# Patient Record
Sex: FEMALE | Race: ASIAN | NOT HISPANIC OR LATINO
[De-identification: names, ages, dates, MRNs, and addresses within clinical notes are randomized per-mention and may not be internally consistent; named-entity substitution may affect disease eponyms.]

---

## 2018-02-05 ENCOUNTER — APPOINTMENT (OUTPATIENT)
Dept: UROLOGY | Facility: CLINIC | Age: 48
End: 2018-02-05
Payer: COMMERCIAL

## 2018-02-05 VITALS
HEIGHT: 62 IN | SYSTOLIC BLOOD PRESSURE: 113 MMHG | DIASTOLIC BLOOD PRESSURE: 71 MMHG | BODY MASS INDEX: 22.45 KG/M2 | TEMPERATURE: 97.7 F | WEIGHT: 122 LBS | HEART RATE: 71 BPM

## 2018-02-05 DIAGNOSIS — Z86.018 PERSONAL HISTORY OF OTHER BENIGN NEOPLASM: ICD-10-CM

## 2018-02-05 PROCEDURE — 99204 OFFICE O/P NEW MOD 45 MIN: CPT

## 2018-03-04 ENCOUNTER — FORM ENCOUNTER (OUTPATIENT)
Age: 48
End: 2018-03-04

## 2018-03-05 ENCOUNTER — APPOINTMENT (OUTPATIENT)
Dept: UROLOGY | Facility: CLINIC | Age: 48
End: 2018-03-05
Payer: COMMERCIAL

## 2018-03-05 ENCOUNTER — OUTPATIENT (OUTPATIENT)
Dept: OUTPATIENT SERVICES | Facility: HOSPITAL | Age: 48
LOS: 1 days | End: 2018-03-05
Payer: COMMERCIAL

## 2018-03-05 ENCOUNTER — APPOINTMENT (OUTPATIENT)
Dept: ULTRASOUND IMAGING | Facility: IMAGING CENTER | Age: 48
End: 2018-03-05
Payer: COMMERCIAL

## 2018-03-05 DIAGNOSIS — Z00.00 ENCOUNTER FOR GENERAL ADULT MEDICAL EXAMINATION WITHOUT ABNORMAL FINDINGS: ICD-10-CM

## 2018-03-05 DIAGNOSIS — N20.1 CALCULUS OF URETER: ICD-10-CM

## 2018-03-05 PROCEDURE — 99213 OFFICE O/P EST LOW 20 MIN: CPT

## 2018-03-05 PROCEDURE — 76770 US EXAM ABDO BACK WALL COMP: CPT | Mod: 26

## 2018-03-05 PROCEDURE — 76770 US EXAM ABDO BACK WALL COMP: CPT

## 2018-09-13 ENCOUNTER — APPOINTMENT (OUTPATIENT)
Dept: UROLOGY | Facility: CLINIC | Age: 48
End: 2018-09-13
Payer: COMMERCIAL

## 2018-09-13 ENCOUNTER — OUTPATIENT (OUTPATIENT)
Dept: OUTPATIENT SERVICES | Facility: HOSPITAL | Age: 48
LOS: 1 days | End: 2018-09-13
Payer: COMMERCIAL

## 2018-09-13 DIAGNOSIS — N20.1 CALCULUS OF URETER: ICD-10-CM

## 2018-09-13 DIAGNOSIS — Z00.00 ENCOUNTER FOR GENERAL ADULT MEDICAL EXAMINATION W/OUT ABNORMAL FINDINGS: ICD-10-CM

## 2018-09-13 DIAGNOSIS — R35.0 FREQUENCY OF MICTURITION: ICD-10-CM

## 2018-09-13 PROCEDURE — 99213 OFFICE O/P EST LOW 20 MIN: CPT | Mod: 25

## 2018-09-13 PROCEDURE — 76775 US EXAM ABDO BACK WALL LIM: CPT | Mod: 26

## 2018-09-13 PROCEDURE — 76775 US EXAM ABDO BACK WALL LIM: CPT

## 2018-09-14 DIAGNOSIS — N20.1 CALCULUS OF URETER: ICD-10-CM

## 2019-09-12 ENCOUNTER — APPOINTMENT (OUTPATIENT)
Dept: UROLOGY | Facility: CLINIC | Age: 49
End: 2019-09-12

## 2024-01-15 ENCOUNTER — LABORATORY RESULT (OUTPATIENT)
Age: 54
End: 2024-01-15

## 2024-01-15 ENCOUNTER — APPOINTMENT (OUTPATIENT)
Dept: PULMONOLOGY | Facility: CLINIC | Age: 54
End: 2024-01-15
Payer: COMMERCIAL

## 2024-01-15 VITALS
BODY MASS INDEX: 25.03 KG/M2 | DIASTOLIC BLOOD PRESSURE: 89 MMHG | OXYGEN SATURATION: 97 % | WEIGHT: 136 LBS | HEART RATE: 80 BPM | HEIGHT: 62 IN | SYSTOLIC BLOOD PRESSURE: 131 MMHG

## 2024-01-15 DIAGNOSIS — I10 ESSENTIAL (PRIMARY) HYPERTENSION: ICD-10-CM

## 2024-01-15 DIAGNOSIS — K76.0 FATTY (CHANGE OF) LIVER, NOT ELSEWHERE CLASSIFIED: ICD-10-CM

## 2024-01-15 PROCEDURE — 94727 GAS DIL/WSHOT DETER LNG VOL: CPT

## 2024-01-15 PROCEDURE — 94729 DIFFUSING CAPACITY: CPT

## 2024-01-15 PROCEDURE — ZZZZZ: CPT

## 2024-01-15 PROCEDURE — 95012 NITRIC OXIDE EXP GAS DETER: CPT

## 2024-01-15 PROCEDURE — 36415 COLL VENOUS BLD VENIPUNCTURE: CPT

## 2024-01-15 PROCEDURE — 99204 OFFICE O/P NEW MOD 45 MIN: CPT | Mod: 25

## 2024-01-15 PROCEDURE — 88738 HGB QUANT TRANSCUTANEOUS: CPT

## 2024-01-15 PROCEDURE — 94060 EVALUATION OF WHEEZING: CPT

## 2024-01-15 RX ORDER — FLUTICASONE PROPIONATE AND SALMETEROL XINAFOATE 115; 21 UG/1; UG/1
115-21 AEROSOL, METERED RESPIRATORY (INHALATION)
Qty: 1 | Refills: 3 | Status: ACTIVE | COMMUNITY
Start: 2024-01-15 | End: 1900-01-01

## 2024-01-15 RX ORDER — LOSARTAN POTASSIUM 100 MG/1
TABLET, FILM COATED ORAL
Refills: 0 | Status: ACTIVE | COMMUNITY

## 2024-01-15 NOTE — ASSESSMENT
[FreeTextEntry1] : Venipuncture with labs drawn in office Get PET scan for further evaluation. Start Advair 115/21 mcg HFA, 2 puffs twice daily for asthma. Start Flonase nasal spray for postnasal drip. Return for pulmonary follow-up after PET scan. Also advised her to closely follow with you clinically. at assisted living facility

## 2024-01-15 NOTE — HISTORY OF PRESENT ILLNESS
[TextBox_4] : Sherry is a pleasant 53-year-old female without significant past medical history, she recently choked on a fishbone, and went to [facility], she had s/p chest CT scan while there, which revealed an 8mm RUL lung nodule. She complains of mild exertional dyspnea and nasal congestion for years, but no chest pain or cough.   She is a non-smoker.

## 2024-01-15 NOTE — DISCUSSION/SUMMARY
[FreeTextEntry1] : Sherry is a patient with cough and nasal congestion likely secondary to a combination of active asthma and postnasal drip.  Secondly, she is a patient with 8 mm right upper lobe spiculated lung nodule, possibly secondary to postinflammatory changes/scarring, need to definitively rule out Malignancy

## 2024-01-15 NOTE — CONSULT LETTER
[Dear  ___] : Dear  [unfilled], [Courtesy Letter:] : I had the pleasure of seeing your patient, [unfilled], in my office today. [Please see my note below.] : Please see my note below. [Consult Closing:] : Thank you very much for allowing me to participate in the care of this patient.  If you have any questions, please do not hesitate to contact me. [Sincerely,] : Sincerely, [FreeTextEntry3] : Dr. Jana Raines

## 2024-01-15 NOTE — REASON FOR VISIT
[Consultation] : a consultation [Abnormal CXR/ Chest CT] : an abnormal CXR/ chest CT [Shortness of Breath] : shortness of breath [TextBox_13] : Dr. Marilia Faustin

## 2024-01-15 NOTE — PROCEDURE
[FreeTextEntry1] : Pulmonary function test performed in my office today: Spirometry shows mild obstructive airway disease with significant improvement postbronchodilator, consistent with asthma; lung volumes within normal limits with air trapping; diffusion is within normal limits. _________  Exhaled Nitric Oxide             Final  No Documents Attached    	Test  	Result  	Flag	Reference	Goal	Last Verified  	Exhaled Nitric Oxide	16	 	 		REQUIRED  Ordered by: ABDI THORNE       Collected/Examined: 15Jan2024 10:24AM       Verification Required       Stage: Final       Performed at: In Office       Performed by: ABDI THORNE       Resulted: 15Jan2024 10:24AM       Last Updated: 15Jan2024 10:25AM  __________ Chest CT scan performed on December 4, 2023 a questionable right lung apex 0.8 cm irregular lung nodule.

## 2024-01-16 LAB
ACE BLD-CCNC: 34 U/L
BASOPHILS # BLD AUTO: 0.04 K/UL
BASOPHILS NFR BLD AUTO: 0.9 %
CK SERPL-CCNC: 50 U/L
CRP SERPL-MCNC: <3 MG/L
ENA JO1 AB SER IA-ACNC: <0.2 AL
ENA SCL70 IGG SER IA-ACNC: <0.2 AL
EOSINOPHIL # BLD AUTO: 0.11 K/UL
EOSINOPHIL NFR BLD AUTO: 2.5 %
ERYTHROCYTE [SEDIMENTATION RATE] IN BLOOD BY WESTERGREN METHOD: 30 MM/HR
HCT VFR BLD CALC: 42 %
HGB BLD-MCNC: 13.6 G/DL
HIV1+2 AB SPEC QL IA.RAPID: NONREACTIVE
IMM GRANULOCYTES NFR BLD AUTO: 0.2 %
LYMPHOCYTES # BLD AUTO: 1.14 K/UL
LYMPHOCYTES NFR BLD AUTO: 26 %
MAN DIFF?: NORMAL
MCHC RBC-ENTMCNC: 30.2 PG
MCHC RBC-ENTMCNC: 32.4 GM/DL
MCV RBC AUTO: 93.3 FL
MONOCYTES # BLD AUTO: 0.32 K/UL
MONOCYTES NFR BLD AUTO: 7.3 %
NEUTROPHILS # BLD AUTO: 2.77 K/UL
NEUTROPHILS NFR BLD AUTO: 63.1 %
PLATELET # BLD AUTO: 275 K/UL
POCT - HEMOGLOBIN (HGB), QUANTITATIVE, TRANSCUTANEOUS: 12.3
RBC # BLD: 4.5 M/UL
RBC # FLD: 13.2 %
RHEUMATOID FACT SER QL: <10 IU/ML
WBC # FLD AUTO: 4.39 K/UL

## 2024-01-21 LAB
ALTERN TENCAPG(M6): 2.5 MCG/ML
ANA SER IF-ACNC: NEGATIVE
ASPER FUMCAPG(M3): 36.1 MCG/ML
AUREOBASCAPG(M12): 3.9 MCG/ML
LACEYELLA SACCHARI IGG: 10 MCG/ML
M TB IFN-G BLD-IMP: NEGATIVE
MICROPOLYCAPG(M22): <2 MCG/ML
PENIC CHRYCAPG(M1): 33.8 MCG/ML
PHOMA BETAE IGG: <2 MCG/ML
QUANTIFERON TB PLUS MITOGEN MINUS NIL: 4.11 IU/ML
QUANTIFERON TB PLUS NIL: 0.02 IU/ML
QUANTIFERON TB PLUS TB1 MINUS NIL: -0.01 IU/ML
QUANTIFERON TB PLUS TB2 MINUS NIL: -0.01 IU/ML
TRICHODERMA VIRIDE IGG: <2 MCG/ML

## 2024-01-25 ENCOUNTER — APPOINTMENT (OUTPATIENT)
Dept: NUCLEAR MEDICINE | Facility: IMAGING CENTER | Age: 54
End: 2024-01-25
Payer: COMMERCIAL

## 2024-01-25 ENCOUNTER — OUTPATIENT (OUTPATIENT)
Dept: OUTPATIENT SERVICES | Facility: HOSPITAL | Age: 54
LOS: 1 days | End: 2024-01-25
Payer: COMMERCIAL

## 2024-01-25 DIAGNOSIS — R91.1 SOLITARY PULMONARY NODULE: ICD-10-CM

## 2024-01-25 PROCEDURE — 78815 PET IMAGE W/CT SKULL-THIGH: CPT | Mod: 26,PI

## 2024-01-25 PROCEDURE — 78815 PET IMAGE W/CT SKULL-THIGH: CPT

## 2024-01-25 PROCEDURE — A9552: CPT

## 2024-01-27 ENCOUNTER — TRANSCRIPTION ENCOUNTER (OUTPATIENT)
Age: 54
End: 2024-01-27

## 2024-01-29 ENCOUNTER — RESULT CHARGE (OUTPATIENT)
Age: 54
End: 2024-01-29

## 2024-01-30 ENCOUNTER — APPOINTMENT (OUTPATIENT)
Dept: PULMONOLOGY | Facility: CLINIC | Age: 54
End: 2024-01-30
Payer: COMMERCIAL

## 2024-01-30 VITALS
OXYGEN SATURATION: 97 % | HEART RATE: 86 BPM | TEMPERATURE: 97.6 F | SYSTOLIC BLOOD PRESSURE: 135 MMHG | RESPIRATION RATE: 16 BRPM | DIASTOLIC BLOOD PRESSURE: 79 MMHG

## 2024-01-30 PROCEDURE — 99214 OFFICE O/P EST MOD 30 MIN: CPT | Mod: 25

## 2024-01-30 PROCEDURE — 95012 NITRIC OXIDE EXP GAS DETER: CPT

## 2024-01-30 PROCEDURE — 94060 EVALUATION OF WHEEZING: CPT

## 2024-01-30 NOTE — HISTORY OF PRESENT ILLNESS
[TextBox_4] : JADEN GIBSON is a 53 year old female, with history of asthma, lung nodule, who presents to the office for follow up evaluation. Patient reports that her cough and dyspnea has improved since her last visit. She states that she continues to take Advair and Flonase with symptomatic relief. Here to review recent PET scan.

## 2024-01-30 NOTE — ASSESSMENT
[FreeTextEntry1] : Will follow up with patient when PET scan results are available. Obtained and reviewed spirometry, NIOX results with patient today.  Continue Advair 115/21 mcg HFA, 2 puffs twice daily for asthma. Continue Flonase nasal spray for postnasal drip. Return for pulmonary follow up.1 month.

## 2024-01-30 NOTE — ADDENDUM
[FreeTextEntry1] : I, Aaron Goldman, acted solely as a scribe for Dr. Jana Raines D.O., on this date 01/30/2024.   All medical record entries made by the Scribe were at my, Dr. Jana Raines D.O., direction and personally dictated by me on 01/30/2024. I have reviewed the chart and agree that the record accurately reflects my personal performance of the history, physical exam, assessment and plan. I have also personally directed, reviewed, and agreed with the chart.

## 2024-01-30 NOTE — DISCUSSION/SUMMARY
[FreeTextEntry1] : Ms. JADEN GIBSON is an 53 year old female, history of asthma, lung nodule. Improved cough and dyspnea.

## 2024-01-30 NOTE — REASON FOR VISIT
[Follow-Up] : a follow-up visit [Cough] : cough [Shortness of Breath] : shortness of breath [Asthma] : asthma [TextBox_44] : PET scan results

## 2024-01-30 NOTE — PROCEDURE
[FreeTextEntry1] : Spirometry is within normal limits with some improvement post bronchodilator. ___ NIOX: 33

## 2024-03-06 ENCOUNTER — APPOINTMENT (OUTPATIENT)
Dept: PULMONOLOGY | Facility: CLINIC | Age: 54
End: 2024-03-06
Payer: COMMERCIAL

## 2024-03-06 VITALS — DIASTOLIC BLOOD PRESSURE: 81 MMHG | OXYGEN SATURATION: 96 % | SYSTOLIC BLOOD PRESSURE: 128 MMHG | HEART RATE: 70 BPM

## 2024-03-06 PROCEDURE — 99214 OFFICE O/P EST MOD 30 MIN: CPT

## 2024-03-06 NOTE — PHYSICAL EXAM
[No Acute Distress] : no acute distress [Normal Oropharynx] : normal oropharynx [Normal Appearance] : normal appearance [No Neck Mass] : no neck mass [Normal Rate/Rhythm] : normal rate/rhythm [Normal S1, S2] : normal s1, s2 [No Resp Distress] : no resp distress [No Murmurs] : no murmurs [Clear to Auscultation Bilaterally] : clear to auscultation bilaterally [No Abnormalities] : no abnormalities [Normal Gait] : normal gait [Benign] : benign [No Cyanosis] : no cyanosis [No Clubbing] : no clubbing [No Edema] : no edema [FROM] : FROM [Normal Color/ Pigmentation] : normal color/ pigmentation [Oriented x3] : oriented x3 [No Focal Deficits] : no focal deficits [Normal Affect] : normal affect

## 2024-03-08 NOTE — ASSESSMENT
[FreeTextEntry1] : Repat chest CT scan for follow-up in 4 months. Continue Advair 115/21 mcg HFA, 2 puffs twice daily for asthma. Continue Flonase nasal spray for postnasal drip. Return for pulmonary follow up3 months.

## 2024-03-08 NOTE — PROCEDURE
[FreeTextEntry1] :   PET/CT FDG Skull to Thigh ONC initial Treatment Strategy             Final  No Documents Attached    	 EXAM: [id number] - PETCT [facility] ONC FDG INIT  - ORDERED BY: ABDI THORNE   PROCEDURE DATE:  01/25/2024    INTERPRETATION:  CLINICAL INFORMATION: 53-year-old male presenting for evaluation of questionable right upper lobe lung nodule on recent outside CT.  TREATMENT STRATEGY EVALUATION: Initial FASTING BLOOD SUGAR: 92 mg/dL RADIOPHARMACEUTICAL:  9.96 mCi F-18, FDG, I.V. UPTAKE PERIOD: 65 minutes SCANNER: [employer] ORAL CONTRAST: Patient drank OMNIPAQUE contrast during the uptake period. PHARMACOLOGIC INTERVENTION: None.  TECHNIQUE: Following intravenous injection of radiopharmaceutical and above uptake period, PET/CT was obtained from base of skull  to mid-thigh. CT protocol was optimized for PET attenuation correction and anatomic localization and was not designed to produce and cannot replace state-of-the-art diagnostic CT images with specific imaging protocols for different body parts and indications. Images were reconstructed and reviewed in axial, coronal and sagittal views and three-dimensional MIP.  The standardized uptake values (SUV) are normalized to patient body weight and indicate the highest activity concentration (SUVmax) in a given site. All image numbers refer to axial image number.  COMPARISON:  No prior FDG-PET/CT  OTHER STUDIES USED FOR CORRELATION: Report of CT chest from an outside institution dated 12/4/2023  FINDINGS:  HEAD/NECK: Physiologic FDG activity in visualized brain, head, and neck. No abnormal FDG activity in the thyroid gland. Previously identified 1.4 cm nodule in the right thyroid lobe is not well visualized.  THORAX: No abnormal FDG activity. No lymphadenopathy.  LUNGS: No abnormal FDG activity. There is 6 mm nodule in the left lower lobe, not included in report of prior chest CT (image 119). This nodule is not FDG-avid, however, it is below the limit of resolution of PET. Biapical scarring. No definite nodule in right upper lobe.  PLEURA/PERICARDIUM: No abnormal FDG activity. No effusion.  HEPATOBILIARY/PANCREAS: Physiologic FDG activity. For reference, normal liver demonstrates SUV mean 2.3. Right hepatic 1.0 cm cyst (5:132).  SPLEEN: Physiologic FDG activity. Normal in size.  ADRENAL GLANDS: No abnormal FDG activity. No nodule.  KIDNEYS/URINARY BLADDER: Physiologic excreted FDG activity.  REPRODUCTIVE ORGANS: No abnormal FDG activity. Hysterectomy.  ABDOMINOPELVIC LYMPH NODES/RETROPERITONEUM: No enlarged or FDG-avid lymph node.  ESOPHAGUS/STOMACH/BOWEL/PERITONEUM/MESENTERY: No abnormal FDG activity.  VESSELS: Unremarkable.  BONES/SOFT TISSUES: No abnormal FDG activity.  IMPRESSION: Skull to thigh FDG-PET/CT scan demonstrates:  1. No evidence of FDG-avid disease.  2. Nonspecific 0.6 cm left lower lobe pulmonary nodule, not identified in report of outside chest CT dated 12/4/2023. Non-FDG-avid biapical scarring. No right upper lobe nodule is identified. If images of the prior CT are made available, direct comparison will be made and an addendum issued. Alternatively, a 6 month follow-up with dedicated CT of chest is recommended to assess for stability.  --- End of Report ---      ERICA HESTER DO; Resident Radiologist This document has been electronically signed. EDDIE MIRZA MD; Attending Nuclear Medicine This document has been electronically signed. Jan 31 2024 12:02PM  Ordered by: ABDI THORNE       Collected/Examined: [id number] 07:26PM       Verified by: ABDI THORNE [id number] 09:08AM       Result Communication: No patient communication needed at this time; Stage: Final       Performed at: [facility] at the [facility]       Resulted: 31Jan2024 08:36AM       Last Updated: 02Feb2024 09:08AM       Accession: [id number]

## 2024-03-08 NOTE — DISCUSSION/SUMMARY
[FreeTextEntry1] : Ms. JADEN GIBSON is an 53 year old female, history of asthma, lung nodule. Improved cough and dyspnea.  New tiny lung nodule on PET/CT scan likely secondary to postinflammatory changes.

## 2024-03-08 NOTE — REASON FOR VISIT
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

## 2024-03-08 NOTE — ADDENDUM
[FreeTextEntry1] : I, Aaron Goldman, acted solely as a scribe for Dr. Jana Raines D.O., on this date 01/30/2024.   All medical record entries made by the Scribe were at my, Dr. Jaan Raines D.O., direction and personally dictated by me on 01/30/2024. I have reviewed the chart and agree that the record accurately reflects my personal performance of the history, physical exam, assessment and plan. I have also personally directed, reviewed, and agreed with the chart.

## 2024-04-28 ENCOUNTER — RX RENEWAL (OUTPATIENT)
Age: 54
End: 2024-04-28

## 2024-04-28 RX ORDER — FLUTICASONE PROPIONATE 50 UG/1
50 SPRAY, METERED NASAL
Qty: 3 | Refills: 11 | Status: ACTIVE | COMMUNITY
Start: 2024-01-15 | End: 1900-01-01

## 2024-05-19 ENCOUNTER — RX RENEWAL (OUTPATIENT)
Age: 54
End: 2024-05-19

## 2024-05-19 RX ORDER — FLUTICASONE PROPIONATE AND SALMETEROL XINAFOATE 115; 21 UG/1; UG/1
115-21 AEROSOL, METERED RESPIRATORY (INHALATION)
Qty: 3 | Refills: 3 | Status: ACTIVE | COMMUNITY
Start: 2024-05-19 | End: 1900-01-01

## 2024-06-05 ENCOUNTER — APPOINTMENT (OUTPATIENT)
Dept: PULMONOLOGY | Facility: CLINIC | Age: 54
End: 2024-06-05
Payer: COMMERCIAL

## 2024-06-05 VITALS — SYSTOLIC BLOOD PRESSURE: 133 MMHG | OXYGEN SATURATION: 97 % | DIASTOLIC BLOOD PRESSURE: 84 MMHG | HEART RATE: 79 BPM

## 2024-06-05 DIAGNOSIS — J45.909 UNSPECIFIED ASTHMA, UNCOMPLICATED: ICD-10-CM

## 2024-06-05 DIAGNOSIS — R91.1 SOLITARY PULMONARY NODULE: ICD-10-CM

## 2024-06-05 DIAGNOSIS — R09.82 POSTNASAL DRIP: ICD-10-CM

## 2024-06-05 DIAGNOSIS — R06.09 OTHER FORMS OF DYSPNEA: ICD-10-CM

## 2024-06-05 PROCEDURE — 99214 OFFICE O/P EST MOD 30 MIN: CPT | Mod: 25

## 2024-06-05 PROCEDURE — 88738 HGB QUANT TRANSCUTANEOUS: CPT

## 2024-06-05 PROCEDURE — ZZZZZ: CPT

## 2024-06-05 PROCEDURE — 94729 DIFFUSING CAPACITY: CPT

## 2024-06-05 PROCEDURE — 94010 BREATHING CAPACITY TEST: CPT

## 2024-06-05 PROCEDURE — 95012 NITRIC OXIDE EXP GAS DETER: CPT

## 2024-06-05 PROCEDURE — 94727 GAS DIL/WSHOT DETER LNG VOL: CPT

## 2024-06-06 PROBLEM — R09.82 POSTNASAL DRIP: Status: ACTIVE | Noted: 2024-01-15

## 2024-06-06 PROBLEM — R06.09 EXERTIONAL DYSPNEA: Status: ACTIVE | Noted: 2024-01-15

## 2024-06-06 NOTE — ADDENDUM
[FreeTextEntry1] : I, Aaron Reyessofi, acted solely as a scribe for Dr. Jana Raines D.O., on this date 06/05/2024.   All medical record entries made by the Scribe were at my, Dr. Jana Raines D.O., direction and personally dictated by me on 06/05/2024. I have reviewed the chart and agree that the record accurately reflects my personal performance of the history, physical exam, assessment and plan. I have also personally directed, reviewed, and agreed with the chart.

## 2024-06-06 NOTE — PROCEDURE
[FreeTextEntry1] : Pulmonary Function Test obtained in office today which revealed: Spirometry: Within normal limits, Lung Volume: Within normal limits, Diffusion: Within normal limits. ___   Exhaled Nitric Oxide             Final  No Documents Attached    	Test  	Result  	Flag	Reference	Goal	Last Verified  	Exhaled Nitric Oxide	22	 	 		REQUIRED  Ordered by: ABDI THORNE       Collected/Examined: [id number] 09:12AM       Verification Required       Stage: Final       Performed at: In Office       Performed by: JADIEL ORTA       Resulted: [id number] 09:12AM       Last Updated: 05Jun2024 09:13AM

## 2024-06-06 NOTE — DISCUSSION/SUMMARY
[FreeTextEntry1] : Ms. JADEN GIBSON is an 53 year old female, history of asthma, postnasal drip, suzi nodule. Patient appears stable from a pulmonary perspective.

## 2024-06-06 NOTE — ASSESSMENT
[FreeTextEntry1] : Obtained and reviewed pulmonary function test, NIOX results with patient today.  Continue Advair 115/21 mcg HFA, 2 puffs twice daily for asthma. Continue Flonase nasal spray for postnasal drip. Repeat chest CT scan in 1 month for follow up. Return for pulmonary follow up in 6 weeks after receiving chest CT scan.

## 2024-06-06 NOTE — HISTORY OF PRESENT ILLNESS
[TextBox_4] : JADEN GIBSON is a 53 year old female, with history of asthma, lung nodule, who presents to the office for follow up evaluation. Patient reports that she is feeling well overall with no respiratory complaints. She denies of having any symptoms of chest pain, dyspnea, or a cough. Patient reports that she continues to take Advair and Flonase with symptomatic relief.

## 2024-06-09 LAB — POCT - HEMOGLOBIN (HGB), QUANTITATIVE, TRANSCUTANEOUS: 12.9

## 2024-06-27 ENCOUNTER — APPOINTMENT (OUTPATIENT)
Dept: CT IMAGING | Facility: IMAGING CENTER | Age: 54
End: 2024-06-27

## 2024-06-27 ENCOUNTER — OUTPATIENT (OUTPATIENT)
Dept: OUTPATIENT SERVICES | Facility: HOSPITAL | Age: 54
LOS: 1 days | End: 2024-06-27
Payer: COMMERCIAL

## 2024-06-27 DIAGNOSIS — R91.1 SOLITARY PULMONARY NODULE: ICD-10-CM

## 2024-06-27 DIAGNOSIS — J45.909 UNSPECIFIED ASTHMA, UNCOMPLICATED: ICD-10-CM

## 2024-06-27 PROCEDURE — 71250 CT THORAX DX C-: CPT

## 2024-06-27 PROCEDURE — 71250 CT THORAX DX C-: CPT | Mod: 26

## 2024-07-23 ENCOUNTER — NON-APPOINTMENT (OUTPATIENT)
Age: 54
End: 2024-07-23

## 2024-07-23 ENCOUNTER — RESULT CHARGE (OUTPATIENT)
Age: 54
End: 2024-07-23

## 2024-07-24 ENCOUNTER — APPOINTMENT (OUTPATIENT)
Dept: PULMONOLOGY | Facility: CLINIC | Age: 54
End: 2024-07-24
Payer: COMMERCIAL

## 2024-07-24 VITALS
HEART RATE: 80 BPM | DIASTOLIC BLOOD PRESSURE: 86 MMHG | RESPIRATION RATE: 16 BRPM | SYSTOLIC BLOOD PRESSURE: 135 MMHG | OXYGEN SATURATION: 97 %

## 2024-07-24 DIAGNOSIS — R91.1 SOLITARY PULMONARY NODULE: ICD-10-CM

## 2024-07-24 DIAGNOSIS — J45.909 UNSPECIFIED ASTHMA, UNCOMPLICATED: ICD-10-CM

## 2024-07-24 DIAGNOSIS — R06.09 OTHER FORMS OF DYSPNEA: ICD-10-CM

## 2024-07-24 DIAGNOSIS — R09.82 POSTNASAL DRIP: ICD-10-CM

## 2024-07-24 PROCEDURE — 95012 NITRIC OXIDE EXP GAS DETER: CPT

## 2024-07-24 PROCEDURE — 94060 EVALUATION OF WHEEZING: CPT

## 2024-07-24 PROCEDURE — 99214 OFFICE O/P EST MOD 30 MIN: CPT | Mod: 25

## 2024-07-24 NOTE — DISCUSSION/SUMMARY
[FreeTextEntry1] : Ms. JADEN GIBSON is an 53 year old female, history of asthma, postnasal drip, stable lung nodule. Patient appears stable from a pulmonary perspective.

## 2024-07-24 NOTE — HISTORY OF PRESENT ILLNESS
[TextBox_4] : JADEN GISBON is a 53 year old female, with history of asthma, lung nodule, who presents to the office for follow up evaluation. Patient reports that she is feeling well overall with no respiratory complaints. She denies of having any symptoms of chest pain, dyspnea, or a cough. Patient reports that she continues to take Advair and Flonase with symptomatic relief.

## 2024-07-24 NOTE — PROCEDURE
[FreeTextEntry1] :   CT Chest No Cont             Final  No Documents Attached    	 EXAM: [id number] - CT CHEST  - ORDERED BY: ABDI THORNE   PROCEDURE DATE:  06/27/2024    INTERPRETATION:  CLINICAL HISTORY / REASON FOR EXAM: Pulmonary nodule.  TECHNIQUE: Multislice helical sections were obtained from the thoracic inlet to the lung bases without IV contrast administration. Coronal and sagittal images have been submitted for evaluation. 3D (MIP) images obtained.  COMPARISON: PET/CT 1/25/2024.  FINDINGS:  LUNGS, PLEURA, AIRWAYS: No lobar consolidation, mass, effusion, or pneumothorax. No evidence of central endobronchial obstruction. No bronchiectasis or honeycombing. Biapical scarring. Unchanged upper lobe subpleural reticular opacities.  Unchanged 0.6 cm left upper lobe solid pulmonary nodule (series 3, image 340).  THORACIC NODES: No mediastinal, hilar, supraclavicular, or axillary lymphadenopathy.  MEDIASTINUM/GREAT VESSELS: No pericardial effusion. Heart size is within normal limits. The aorta and main pulmonary artery are of normal caliber.  BONES/SOFT TISSUES: Unremarkable.  VISUALIZED UPPER ABDOMEN: Hepatic steatosis. Hepatic cyst..   IMPRESSION:  Unchanged 0.6 cm left upper lobe solid pulmonary nodule. Per Fleischner Society 2017 guidelines, follow-up CT chest between June 2025 through December 2025 is recommended in a high risk patient and is a consideration in a low risk patient.  --- End of Report ---       MARLON EDWARDS MD; Attending Radiologist This document has been electronically signed. Jul 6 2024  5:38PM  Ordered by: ABDI THORNE       Collected/Examined: [id number] 02:36PM       Verified by: ABDI THORNE [id number] 10:24AM       Result Communication: No patient communication needed at this time; Stage: Final       Performed at: [facility] at the [facility]       Resulted: [id number] 05:33PM       Last Updated: [id number] 10:24AM       Accession: [id number]        ____  Spirometry is within normal limits with no improvement postbronchodilator. _______  Exhaled Nitric Oxide             Final  No Documents Attached    	Test  	Result  	Flag	Reference	Goal	Last Verified  	Exhaled Nitric Oxide	27	 	 		REQUIRED  Ordered by: ABDI THORNE       Collected/Examined: [id number] 09:46AM       Verification Required       Stage: Final       Performed at: In Office       Performed by: ABDI THORNE       Resulted: [id number] 09:46AM       Last Updated: [id number] 09:46AM

## 2024-07-24 NOTE — ASSESSMENT
[FreeTextEntry1] : Obtained and reviewed spirometry, NIOX results with patient today.  Continue Advair 115/21 mcg HFA, 2 puffs twice daily for asthma. Continue Flonase nasal spray for postnasal drip. Repeat chest CT scan in 1 year for follow up on the stable lung nodule. Return for pulmonary follow up in 3 months.

## 2024-07-24 NOTE — PROCEDURE
[FreeTextEntry1] :   CT Chest No Cont             Final  No Documents Attached    	 EXAM: [id number] - CT CHEST  - ORDERED BY: ABDI THORNE   PROCEDURE DATE:  06/27/2024    INTERPRETATION:  CLINICAL HISTORY / REASON FOR EXAM: Pulmonary nodule.  TECHNIQUE: Multislice helical sections were obtained from the thoracic inlet to the lung bases without IV contrast administration. Coronal and sagittal images have been submitted for evaluation. 3D (MIP) images obtained.  COMPARISON: PET/CT 1/25/2024.  FINDINGS:  LUNGS, PLEURA, AIRWAYS: No lobar consolidation, mass, effusion, or pneumothorax. No evidence of central endobronchial obstruction. No bronchiectasis or honeycombing. Biapical scarring. Unchanged upper lobe subpleural reticular opacities.  Unchanged 0.6 cm left upper lobe solid pulmonary nodule (series 3, image 340).  THORACIC NODES: No mediastinal, hilar, supraclavicular, or axillary lymphadenopathy.  MEDIASTINUM/GREAT VESSELS: No pericardial effusion. Heart size is within normal limits. The aorta and main pulmonary artery are of normal caliber.  BONES/SOFT TISSUES: Unremarkable.  VISUALIZED UPPER ABDOMEN: Hepatic steatosis. Hepatic cyst..   IMPRESSION:  Unchanged 0.6 cm left upper lobe solid pulmonary nodule. Per Fleischner Society 2017 guidelines, follow-up CT chest between June 2025 through December 2025 is recommended in a high risk patient and is a consideration in a low risk patient.  --- End of Report ---       MARLON EDWARDS MD; Attending Radiologist This document has been electronically signed. Jul 6 2024  5:38PM  Ordered by: ABDI THORNE       Collected/Examined: [id number] 02:36PM       Verified by: ABDI THORNE [id number] 10:24AM       Result Communication: No patient communication needed at this time; Stage: Final       Performed at: [facility] at the [facility]       Resulted: [id number] 05:33PM       Last Updated: [id number] 10:24AM       Accession: [id number]        ____  Spirometry is within normal limits with no improvement postbronchodilator. _______  Exhaled Nitric Oxide             Final  No Documents Attached    	Test  	Result  	Flag	Reference	Goal	Last Verified  	Exhaled Nitric Oxide	27	 	 		REQUIRED  Ordered by: ABDI THORNE       Collected/Examined: [id number] 09:46AM       Verification Required       Stage: Final       Performed at: In Office       Performed by: ABDI THORNE       Resulted: [id number] 09:46AM       Last Updated: [id number] 09:46AM

## 2024-10-23 ENCOUNTER — APPOINTMENT (OUTPATIENT)
Dept: PULMONOLOGY | Facility: CLINIC | Age: 54
End: 2024-10-23
Payer: COMMERCIAL

## 2024-10-23 VITALS — OXYGEN SATURATION: 97 % | HEART RATE: 75 BPM | SYSTOLIC BLOOD PRESSURE: 102 MMHG | DIASTOLIC BLOOD PRESSURE: 67 MMHG

## 2024-10-23 DIAGNOSIS — J45.909 UNSPECIFIED ASTHMA, UNCOMPLICATED: ICD-10-CM

## 2024-10-23 DIAGNOSIS — Z23 ENCOUNTER FOR IMMUNIZATION: ICD-10-CM

## 2024-10-23 DIAGNOSIS — R91.1 SOLITARY PULMONARY NODULE: ICD-10-CM

## 2024-10-23 DIAGNOSIS — R09.82 POSTNASAL DRIP: ICD-10-CM

## 2024-10-23 PROCEDURE — 94010 BREATHING CAPACITY TEST: CPT

## 2024-10-23 PROCEDURE — 88738 HGB QUANT TRANSCUTANEOUS: CPT

## 2024-10-23 PROCEDURE — 94729 DIFFUSING CAPACITY: CPT

## 2024-10-23 PROCEDURE — G0008: CPT

## 2024-10-23 PROCEDURE — 90656 IIV3 VACC NO PRSV 0.5 ML IM: CPT

## 2024-10-23 PROCEDURE — 95012 NITRIC OXIDE EXP GAS DETER: CPT

## 2024-10-23 PROCEDURE — 94727 GAS DIL/WSHOT DETER LNG VOL: CPT

## 2024-10-23 PROCEDURE — 99214 OFFICE O/P EST MOD 30 MIN: CPT | Mod: 25

## 2024-10-23 PROCEDURE — ZZZZZ: CPT

## 2024-10-28 LAB — POCT - HEMOGLOBIN (HGB), QUANTITATIVE, TRANSCUTANEOUS: 15.1

## 2025-01-23 ENCOUNTER — APPOINTMENT (OUTPATIENT)
Dept: PULMONOLOGY | Facility: CLINIC | Age: 55
End: 2025-01-23
Payer: COMMERCIAL

## 2025-01-23 VITALS
SYSTOLIC BLOOD PRESSURE: 132 MMHG | DIASTOLIC BLOOD PRESSURE: 82 MMHG | HEIGHT: 62 IN | OXYGEN SATURATION: 96 % | WEIGHT: 135.38 LBS | HEART RATE: 77 BPM | BODY MASS INDEX: 24.91 KG/M2

## 2025-01-23 DIAGNOSIS — R09.82 POSTNASAL DRIP: ICD-10-CM

## 2025-01-23 DIAGNOSIS — R06.09 OTHER FORMS OF DYSPNEA: ICD-10-CM

## 2025-01-23 DIAGNOSIS — J45.909 UNSPECIFIED ASTHMA, UNCOMPLICATED: ICD-10-CM

## 2025-01-23 DIAGNOSIS — R91.1 SOLITARY PULMONARY NODULE: ICD-10-CM

## 2025-01-23 PROCEDURE — 95012 NITRIC OXIDE EXP GAS DETER: CPT

## 2025-01-23 PROCEDURE — 94727 GAS DIL/WSHOT DETER LNG VOL: CPT

## 2025-01-23 PROCEDURE — 99214 OFFICE O/P EST MOD 30 MIN: CPT | Mod: 25

## 2025-01-23 PROCEDURE — ZZZZZ: CPT

## 2025-01-23 PROCEDURE — 94010 BREATHING CAPACITY TEST: CPT

## 2025-01-23 PROCEDURE — 94729 DIFFUSING CAPACITY: CPT

## 2025-04-22 ENCOUNTER — NON-APPOINTMENT (OUTPATIENT)
Age: 55
End: 2025-04-22

## 2025-04-24 ENCOUNTER — APPOINTMENT (OUTPATIENT)
Dept: PULMONOLOGY | Facility: CLINIC | Age: 55
End: 2025-04-24
Payer: COMMERCIAL

## 2025-04-24 VITALS
HEART RATE: 74 BPM | HEIGHT: 62 IN | SYSTOLIC BLOOD PRESSURE: 118 MMHG | WEIGHT: 128 LBS | RESPIRATION RATE: 16 BRPM | OXYGEN SATURATION: 94 % | BODY MASS INDEX: 23.55 KG/M2 | DIASTOLIC BLOOD PRESSURE: 77 MMHG

## 2025-04-24 DIAGNOSIS — R06.09 OTHER FORMS OF DYSPNEA: ICD-10-CM

## 2025-04-24 DIAGNOSIS — R09.82 POSTNASAL DRIP: ICD-10-CM

## 2025-04-24 DIAGNOSIS — R91.1 SOLITARY PULMONARY NODULE: ICD-10-CM

## 2025-04-24 DIAGNOSIS — J45.909 UNSPECIFIED ASTHMA, UNCOMPLICATED: ICD-10-CM

## 2025-04-24 PROCEDURE — 94729 DIFFUSING CAPACITY: CPT

## 2025-04-24 PROCEDURE — 94727 GAS DIL/WSHOT DETER LNG VOL: CPT

## 2025-04-24 PROCEDURE — 88738 HGB QUANT TRANSCUTANEOUS: CPT

## 2025-04-24 PROCEDURE — 94010 BREATHING CAPACITY TEST: CPT

## 2025-04-24 PROCEDURE — 95012 NITRIC OXIDE EXP GAS DETER: CPT

## 2025-04-24 PROCEDURE — ZZZZZ: CPT

## 2025-04-24 PROCEDURE — 99214 OFFICE O/P EST MOD 30 MIN: CPT | Mod: 25

## 2025-05-04 LAB — POCT - HEMOGLOBIN (HGB), QUANTITATIVE, TRANSCUTANEOUS: 13

## 2025-05-16 ENCOUNTER — APPOINTMENT (OUTPATIENT)
Dept: CT IMAGING | Facility: IMAGING CENTER | Age: 55
End: 2025-05-16

## 2025-05-16 ENCOUNTER — OUTPATIENT (OUTPATIENT)
Dept: OUTPATIENT SERVICES | Facility: HOSPITAL | Age: 55
LOS: 1 days | End: 2025-05-16
Payer: COMMERCIAL

## 2025-05-16 DIAGNOSIS — R91.1 SOLITARY PULMONARY NODULE: ICD-10-CM

## 2025-05-16 PROCEDURE — 71250 CT THORAX DX C-: CPT | Mod: 26

## 2025-05-16 PROCEDURE — 71250 CT THORAX DX C-: CPT

## 2025-05-26 ENCOUNTER — RX RENEWAL (OUTPATIENT)
Age: 55
End: 2025-05-26

## 2025-07-31 ENCOUNTER — APPOINTMENT (OUTPATIENT)
Dept: PULMONOLOGY | Facility: CLINIC | Age: 55
End: 2025-07-31
Payer: COMMERCIAL

## 2025-07-31 VITALS
OXYGEN SATURATION: 98 % | HEART RATE: 68 BPM | DIASTOLIC BLOOD PRESSURE: 82 MMHG | RESPIRATION RATE: 14 BRPM | SYSTOLIC BLOOD PRESSURE: 125 MMHG

## 2025-07-31 DIAGNOSIS — R91.1 SOLITARY PULMONARY NODULE: ICD-10-CM

## 2025-07-31 DIAGNOSIS — K76.0 FATTY (CHANGE OF) LIVER, NOT ELSEWHERE CLASSIFIED: ICD-10-CM

## 2025-07-31 DIAGNOSIS — R06.09 OTHER FORMS OF DYSPNEA: ICD-10-CM

## 2025-07-31 DIAGNOSIS — J45.909 UNSPECIFIED ASTHMA, UNCOMPLICATED: ICD-10-CM

## 2025-07-31 DIAGNOSIS — R09.82 POSTNASAL DRIP: ICD-10-CM

## 2025-07-31 PROCEDURE — 99214 OFFICE O/P EST MOD 30 MIN: CPT | Mod: 25

## 2025-07-31 PROCEDURE — 95012 NITRIC OXIDE EXP GAS DETER: CPT

## 2025-07-31 PROCEDURE — 94727 GAS DIL/WSHOT DETER LNG VOL: CPT

## 2025-07-31 PROCEDURE — ZZZZZ: CPT

## 2025-07-31 PROCEDURE — 94010 BREATHING CAPACITY TEST: CPT

## 2025-07-31 PROCEDURE — 94729 DIFFUSING CAPACITY: CPT
